# Patient Record
Sex: FEMALE | Race: WHITE
[De-identification: names, ages, dates, MRNs, and addresses within clinical notes are randomized per-mention and may not be internally consistent; named-entity substitution may affect disease eponyms.]

---

## 2018-01-23 ENCOUNTER — HOSPITAL ENCOUNTER (EMERGENCY)
Dept: HOSPITAL 63 - ER | Age: 28
Discharge: HOME | End: 2018-01-23
Payer: COMMERCIAL

## 2018-01-23 VITALS — WEIGHT: 186.73 LBS | HEIGHT: 63 IN | BODY MASS INDEX: 33.09 KG/M2

## 2018-01-23 VITALS — SYSTOLIC BLOOD PRESSURE: 122 MMHG | DIASTOLIC BLOOD PRESSURE: 70 MMHG

## 2018-01-23 DIAGNOSIS — Z88.1: ICD-10-CM

## 2018-01-23 DIAGNOSIS — Z79.4: ICD-10-CM

## 2018-01-23 DIAGNOSIS — F41.0: Primary | ICD-10-CM

## 2018-01-23 DIAGNOSIS — E10.65: ICD-10-CM

## 2018-01-23 DIAGNOSIS — Z88.0: ICD-10-CM

## 2018-01-23 LAB
ALBUMIN SERPL-MCNC: 3.7 G/DL (ref 3.4–5)
ALBUMIN/GLOB SERPL: 0.9 {RATIO} (ref 1–1.7)
ALP SERPL-CCNC: 95 U/L (ref 46–116)
ALT SERPL-CCNC: 27 U/L (ref 14–59)
ANION GAP SERPL CALC-SCNC: 11 MMOL/L (ref 6–14)
APTT PPP: YELLOW S
AST SERPL-CCNC: 20 U/L (ref 15–37)
BACTERIA #/AREA URNS HPF: 0 /HPF
BASOPHILS # BLD AUTO: 0.1 X10^3/UL (ref 0–0.2)
BASOPHILS NFR BLD: 1 % (ref 0–3)
BILIRUB SERPL-MCNC: 0.2 MG/DL (ref 0.2–1)
BILIRUB UR QL STRIP: (no result)
BUN ISTAT: 20 MG/DL (ref 8–26)
BUN/CREAT SERPL: 15 (ref 6–20)
CA-I SERPL ISE-MCNC: 20 MG/DL (ref 7–20)
CALCIUM SERPL-MCNC: 9 MG/DL (ref 8.5–10.1)
CHLORIDE SERPL-SCNC: 95 MMOL/L (ref 98–107)
CO2 SERPL-SCNC: 26 MMOL/L (ref 21–32)
CREAT SERPL-MCNC: 1.3 MG/DL (ref 0.6–1)
EOSINOPHIL NFR BLD: 0.2 X10^3/UL (ref 0–0.7)
EOSINOPHIL NFR BLD: 2 % (ref 0–3)
ERYTHROCYTE [DISTWIDTH] IN BLOOD BY AUTOMATED COUNT: 13.7 % (ref 11.5–14.5)
FIBRINOGEN PPP-MCNC: CLEAR MG/DL
GFR SERPLBLD BASED ON 1.73 SQ M-ARVRAT: 49.5 ML/MIN
GLOBULIN SER-MCNC: 4 G/DL (ref 2.2–3.8)
GLUCOSE BLD-MCNC: 378 MG/DL (ref 60–99)
GLUCOSE SERPL-MCNC: 388 MG/DL (ref 70–99)
GLUCOSE UR STRIP-MCNC: >=1000 MG/DL
HCT VFR BLD AUTO: 45 %
HCT VFR BLD CALC: 43.4 % (ref 36–47)
HEMOGLOBIN ISTAT: 15.3 GM/DL
HGB BLD-MCNC: 14.7 G/DL (ref 12–15.5)
LIPASE: 123 U/L (ref 73–393)
LYMPHOCYTES # BLD: 2.9 X10^3/UL (ref 1–4.8)
LYMPHOCYTES NFR BLD AUTO: 31 % (ref 24–48)
MCH RBC QN AUTO: 30 PG (ref 25–35)
MCHC RBC AUTO-ENTMCNC: 34 G/DL (ref 31–37)
MCV RBC AUTO: 87 FL (ref 79–100)
MONO #: 0.5 X10^3/UL (ref 0–1.1)
MONOCYTES NFR BLD: 5 % (ref 0–9)
NEUT #: 6 X10^3UL (ref 1.8–7.7)
NEUTROPHILS NFR BLD AUTO: 62 % (ref 31–73)
NITRITE UR QL STRIP: (no result)
PLATELET # BLD AUTO: 290 X10^3/UL (ref 140–400)
POTASSIUM BLD-SCNC: 3.8 MMOL/L (ref 3.5–5)
POTASSIUM SERPL-SCNC: 3.8 MMOL/L (ref 3.5–5.1)
PROT SERPL-MCNC: 7.7 G/DL (ref 6.4–8.2)
RBC # BLD AUTO: 4.98 X10^6/UL (ref 3.5–5.4)
RBC #/AREA URNS HPF: 0 /HPF (ref 0–2)
SODIUM SERPL-SCNC: 132 MMOL/L (ref 136–145)
SODIUM SERPL-SCNC: 133 MMOL/L (ref 135–145)
SP GR UR STRIP: <=1.005
SQUAMOUS #/AREA URNS LPF: (no result) /LPF
UROBILINOGEN UR-MCNC: 0.2 MG/DL
WBC # BLD AUTO: 9.7 X10^3/UL (ref 4–11)
WBC #/AREA URNS HPF: 0 /HPF (ref 0–4)

## 2018-01-23 PROCEDURE — 96361 HYDRATE IV INFUSION ADD-ON: CPT

## 2018-01-23 PROCEDURE — 71045 X-RAY EXAM CHEST 1 VIEW: CPT

## 2018-01-23 PROCEDURE — 80053 COMPREHEN METABOLIC PANEL: CPT

## 2018-01-23 PROCEDURE — 83690 ASSAY OF LIPASE: CPT

## 2018-01-23 PROCEDURE — 82947 ASSAY GLUCOSE BLOOD QUANT: CPT

## 2018-01-23 PROCEDURE — 81001 URINALYSIS AUTO W/SCOPE: CPT

## 2018-01-23 PROCEDURE — 36415 COLL VENOUS BLD VENIPUNCTURE: CPT

## 2018-01-23 PROCEDURE — 93005 ELECTROCARDIOGRAM TRACING: CPT

## 2018-01-23 PROCEDURE — 85025 COMPLETE CBC W/AUTO DIFF WBC: CPT

## 2018-01-23 PROCEDURE — 80047 BASIC METABLC PNL IONIZED CA: CPT

## 2018-01-23 PROCEDURE — 96374 THER/PROPH/DIAG INJ IV PUSH: CPT

## 2018-01-23 PROCEDURE — 83605 ASSAY OF LACTIC ACID: CPT

## 2018-01-23 PROCEDURE — 99285 EMERGENCY DEPT VISIT HI MDM: CPT

## 2018-01-23 NOTE — RAD
Portable chest, 1/23/2018:



History: Chest pain



Comparison is made to a study from 3/18/2013.  The heart size is normal.  The

lungs are clear.  There is no evidence of pleural fluid.



IMPRESSION: No acute cardiopulmonary abnormality is detected.

## 2018-01-23 NOTE — PHYS DOC
General


Chief Complaint:  ANXIETY/PANIC ATTACK


Stated Complaint:  ANXIETY


Time Seen by MD:  00:55


Source:  patient


Exam Limitations:  no limitations


Problems:  





History of Present Illness


Initial Comments


Patient is a 27-year-old female who comes to the ED complaining of what she 

thinks is a panic attack.


Patient states that for the past several days she's had trouble focusing, she 

is having trouble sleeping and performing at her job. She states that she's 

been very anxious, she's had chest tightness and shortness of breath and has 

also had a great deal of difficulty controlling her glucose. Patient is 

hyperventilating on arrival and complains of bilateral hands and lips tingling/

numbness. Patient is a type I diabetic and states she's taken "a ton of insulin

" today but has been unable to control her glucose. She denies any nausea or 

vomiting she does complain of polyuria and some abdominal pain and mild 

headache.


She denies any fever chills sweats or focal pain complaints consistent with 

possible infection.


Hand and lip symptoms resolve shortly after the patient is coached to stop 

hyperventilating.





ED vitals: Afebrile, 87, 22, 126/71, 96% room air


Timing/Duration:  other (past several days)


Severity:  severe


Modifying Factors:  improves with other


Associated Symptoms:  chest pain, headaches, malaise, shortness of breath, 

weakness, other


Allergies:  


Coded Allergies:  


     Penicillins (Verified  Allergy, Intermediate, rash, 1/11/17)


     metformin (Verified  Allergy, Intermediate, rash, 1/11/17)


     vancomycin (Verified  Allergy, Intermediate, red man syndrome, 1/11/17)


     cefuroxime (Verified  Allergy, Unknown, 1/17/17)





Past Medical History


Medical History:  diabetes


Surgical History:  other (abdominal abscess)


Psychosocial History:  anxiety





Social History


Smoker:  non-smoker


Alcohol:  none


Drugs:  none





Review of Systems


Constitutional:  denies chills, denies diaphoresis, denies fever, malaise


Respiratory:  denies cough, shortness of breath, denies wheezing


Cardiovascular:  chest pain, palpitations, denies syncope


Gastrointestinal:  abdominal pain, denies diarrhea, nausea, denies vomiting


Genitourinary:  see HPI


Musculoskeletal:  denies back pain, denies joint swelling, denies neck pain


Psychiatric/Neurological:  headache, denies numbness, denies paresthesia


Hematologic/Lymphatic:  denies blood clots, denies easy bleeding, denies easy 

bruising





Physical Exam


General Appearance:  WD/WN, mild distress


Eyes:  bilateral eye normal inspection, bilateral eye PERRL, bilateral eye EOMI


Ear, Nose, Throat:  hearing grossly normal, normal ENT inspection, normal 

pharynx


Neck:  non-tender, supple


Respiratory:  normal breath sounds, no respiratory distress


Cardiovascular:  normal peripheral pulses, regular rate, rhythm


Gastrointestinal:  non tender, soft


Back:  no CVA tenderness, no vertebral tenderness


Extremities:  normal range of motion, non-tender, normal inspection


Neurologic/Psychiatric:  CNs II-XII nml as tested, no motor/sensory deficits, 

alert, oriented x 3, other (anxious, denies suicidal or homicidal ideation 

judgment and insight appear to be intact)


Skin:  normal color, warm/dry





Orders, Labs, Meds


EKG: Normal sinus rhythm 93 bpm, nonspecific T contour abnormality no ST 

segment elevation interpreted by me





I-STAT BMP: Sodium 133, potassium 3.8, CO2 26, BUN 20, creatinine 1.0, glucose 

378, anion gap 17





Due to acidosis serum lab orders initiated: CBC unremarkable, sodium 132, 

potassium 3.8, CO2 26, BUN 20, creatinine 1.3, glucose 388, anion gap 11, 

lactic acid 2.3





Urinalysis: Glucose greater than 1000, no ketones or products of infection





0300: Patient just received her insulin, I discussed findings with her. 

Hyperventilation could be etiology of her lactic acidosis especially as her 

serum labs reveal normal anion gap. Repeat liter bolus of normal saline, Xanax 

0.5 mg given by mouth Will recheck patient's symptoms and glucose in 45 minutes 

to an hour and determine her disposition.





0339: Recheck of glucose 200.





0429: Patient states that she's feeling about 50% better overall with her 

anxiety and other symptoms. Her chest pain has resolved, she states that she 

used to take 2 mg of Xanax 3 times daily. I advised her that not only would I 

not prescribe that but that it was not a safe long-term solution for her 

anxiety. I offered her admission for observation which she declines. She 

requests one more 0.5 mg Xanax and discharged home, states she will follow-up 

with the [facility] or a walk-in doctor's office today or tomorrow. I 

discussed signs and symptoms to monitor as well as indications for urgent 

return to the department. I discussed the addictive potential of Xanax, 

prescription and over-the-counter medications, as well as exercise and therapy. 

The patient's questions were answered she expressed agreement and understanding 

of the treatment plan. She agrees to be compliant with her diet and diabetes 

medication regimen


Departure


Time of Disposition:  04:31


Disposition:  01 HOME, SELF-CARE


Diagnosis:  panic attack, uncontrolled diabetes nonketotic hyp


Condition:  IMPROVED


Patient Instructions:  Anxiety and Panic Attacks, Easy-to-Read, Form - Daily 

Diabetes Record





Additional Instructions:  


Please review the patient education materials given by ED staff.


As discussed observation admission was offered to you and you decline at this 

time.


Aggressive hydration.


Maintained consistent ADA diet and insulin regimen.


Log your blood glucose before each meal and at bedtime, take those values with 

you to your next physician's appointment.


Prescription: Xanax 0.25 mg quantity 5


Follow-up at the guidance Center or local physician's office for walk-in 

appointment today or tomorrow.


Return to ED with new or changing symptoms.











ANIYAH DOUGLASS DO Jan 23, 2018 01:29

## 2018-01-23 NOTE — EKG
[facility] [address], [place] [address]

Test Date:    2018               Test Time:    01:14:36

Pat Name:     LINETTE WISE          Department:   

Patient ID:   [id number]           Room:          

Gender:       F                        Technician:   MARLO

:          1990               Requested By: ANIYAH DOUGLASS

Order Number: [id number]            Reading MD:   Rony Gamble MD

                                 Measurements

Intervals                              Axis          

Rate:         93                       P:            31

MO:           114                      QRS:          41

QRSD:         90                       T:            26

QT:           372                                    

QTc:          465                                    

                           Interpretive Statements

SINUS RHYTHM



Electronically Signed On 2018 12:26:59 CST by Rony Gamble MD

## 2018-05-09 ENCOUNTER — HOSPITAL ENCOUNTER (EMERGENCY)
Dept: HOSPITAL 63 - ER | Age: 28
Discharge: HOME | End: 2018-05-09
Payer: SELF-PAY

## 2018-05-09 VITALS — SYSTOLIC BLOOD PRESSURE: 116 MMHG | DIASTOLIC BLOOD PRESSURE: 59 MMHG

## 2018-05-09 VITALS — BODY MASS INDEX: 32.61 KG/M2 | WEIGHT: 191 LBS | HEIGHT: 64 IN

## 2018-05-09 DIAGNOSIS — Z88.1: ICD-10-CM

## 2018-05-09 DIAGNOSIS — E10.65: Primary | ICD-10-CM

## 2018-05-09 DIAGNOSIS — Z71.6: ICD-10-CM

## 2018-05-09 DIAGNOSIS — Z88.0: ICD-10-CM

## 2018-05-09 DIAGNOSIS — F12.10: ICD-10-CM

## 2018-05-09 DIAGNOSIS — E10.10: ICD-10-CM

## 2018-05-09 DIAGNOSIS — E86.0: ICD-10-CM

## 2018-05-09 DIAGNOSIS — F41.9: ICD-10-CM

## 2018-05-09 DIAGNOSIS — Z88.8: ICD-10-CM

## 2018-05-09 DIAGNOSIS — F17.210: ICD-10-CM

## 2018-05-09 LAB
% BANDS: 3 % (ref 0–9)
% LYMPHS: 11 % (ref 24–48)
% MONOS: 4 % (ref 0–10)
% SEGS: 77 % (ref 35–66)
ALBUMIN SERPL-MCNC: 3.1 G/DL (ref 3.4–5)
ALBUMIN/GLOB SERPL: 0.8 {RATIO} (ref 1–1.7)
ALP SERPL-CCNC: 103 U/L (ref 46–116)
ALT SERPL-CCNC: 26 U/L (ref 14–59)
AMPHETAMINE/METHAMPHETAMINE: (no result)
ANION GAP SERPL CALC-SCNC: 18 MMOL/L (ref 6–14)
APTT PPP: (no result) S
AST SERPL-CCNC: 18 U/L (ref 15–37)
BACTERIA #/AREA URNS HPF: 0 /HPF
BARBITURATES UR-MCNC: (no result) UG/ML
BASOPHILS # BLD AUTO: 0 X10^3/UL (ref 0–0.2)
BASOPHILS NFR BLD: 0 % (ref 0–3)
BENZODIAZ UR-MCNC: (no result) UG/L
BGAS PCO2: 31 MMHG (ref 35–45)
BGAS PH: 7.42 (ref 7.35–7.45)
BGAS PO2: 75 MMHG (ref 80–100)
BILIRUB SERPL-MCNC: 0.4 MG/DL (ref 0.2–1)
BILIRUB UR QL STRIP: (no result)
BUN/CREAT SERPL: 13 (ref 6–20)
CA-I SERPL ISE-MCNC: 16 MG/DL (ref 7–20)
CALCIUM SERPL-MCNC: 9.8 MG/DL (ref 8.5–10.1)
CANNABINOIDS UR-MCNC: (no result) UG/L
CHLORIDE SERPL-SCNC: 96 MMOL/L (ref 98–107)
CO2 SERPL-SCNC: 21 MMOL/L (ref 21–32)
COCAINE UR-MCNC: (no result) NG/ML
CREAT SERPL-MCNC: 1.2 MG/DL (ref 0.6–1)
DELTA BASE BGAS: -5 MMOL/L (ref 0–3)
EOSINOPHIL NFR BLD AUTO: 2 % (ref 0–5)
EOSINOPHIL NFR BLD: 0.3 X10^3/UL (ref 0–0.7)
EOSINOPHIL NFR BLD: 2 % (ref 0–3)
ERYTHROCYTE [DISTWIDTH] IN BLOOD BY AUTOMATED COUNT: 14.1 % (ref 11.5–14.5)
FIBRINOGEN PPP-MCNC: CLEAR MG/DL
GFR SERPLBLD BASED ON 1.73 SQ M-ARVRAT: 53.9 ML/MIN
GLOBULIN SER-MCNC: 4.1 G/DL (ref 2.2–3.8)
GLUCOSE SERPL-MCNC: 457 MG/DL (ref 70–99)
GLUCOSE UR STRIP-MCNC: >=1000 MG/DL
HCT VFR BLD CALC: 42.4 % (ref 36–47)
HGB BLD-MCNC: 14 G/DL (ref 12–15.5)
LIPASE: 100 U/L (ref 73–393)
LYMPHOCYTES # BLD: 1.6 X10^3/UL (ref 1–4.8)
LYMPHOCYTES NFR BLD AUTO: 10 % (ref 24–48)
MAGNESIUM SERPL-MCNC: 1.2 MG/DL (ref 1.8–2.4)
MCH RBC QN AUTO: 30 PG (ref 25–35)
MCHC RBC AUTO-ENTMCNC: 33 G/DL (ref 31–37)
MCV RBC AUTO: 90 FL (ref 79–100)
METHADONE SERPL-MCNC: (no result) NG/ML
MONO #: 0.5 X10^3/UL (ref 0–1.1)
MONOCYTES NFR BLD: 3 % (ref 0–9)
NEUT #: 13.5 X10^3UL (ref 1.8–7.7)
NEUTROPHILS NFR BLD AUTO: 85 % (ref 31–73)
NITRITE UR QL STRIP: (no result)
O2 SAT BGAS: 96 % (ref 92–99)
O2/TOTAL GAS SETTING VFR VENT: 21 %
OPIATES UR-MCNC: (no result) NG/ML
PCP SERPL-MCNC: (no result) MG/DL
PHOSPHATE SERPL-MCNC: 4.4 MG/DL (ref 2.6–4.7)
PLATELET # BLD AUTO: 250 X10^3/UL (ref 140–400)
PLATELET # BLD EST: ADEQUATE 10*3/UL
POTASSIUM SERPL-SCNC: 4.3 MMOL/L (ref 3.5–5.1)
PROT SERPL-MCNC: 7.2 G/DL (ref 6.4–8.2)
RBC # BLD AUTO: 4.7 X10^6/UL (ref 3.5–5.4)
RBC #/AREA URNS HPF: 0 /HPF (ref 0–2)
SODIUM SERPL-SCNC: 135 MMOL/L (ref 136–145)
SP GR UR STRIP: 1.01
SQUAMOUS #/AREA URNS LPF: (no result) /LPF
UROBILINOGEN UR-MCNC: 0.2 MG/DL
WBC # BLD AUTO: 15.8 X10^3/UL (ref 4–11)
WBC #/AREA URNS HPF: 0 /HPF (ref 0–4)

## 2018-05-09 PROCEDURE — 80307 DRUG TEST PRSMV CHEM ANLYZR: CPT

## 2018-05-09 PROCEDURE — 82010 KETONE BODYS QUAN: CPT

## 2018-05-09 PROCEDURE — 83735 ASSAY OF MAGNESIUM: CPT

## 2018-05-09 PROCEDURE — 83690 ASSAY OF LIPASE: CPT

## 2018-05-09 PROCEDURE — G0479 DRUG TEST PRESUMP NOT OPT: HCPCS

## 2018-05-09 PROCEDURE — 36415 COLL VENOUS BLD VENIPUNCTURE: CPT

## 2018-05-09 PROCEDURE — 96374 THER/PROPH/DIAG INJ IV PUSH: CPT

## 2018-05-09 PROCEDURE — 96361 HYDRATE IV INFUSION ADD-ON: CPT

## 2018-05-09 PROCEDURE — 96375 TX/PRO/DX INJ NEW DRUG ADDON: CPT

## 2018-05-09 PROCEDURE — 82803 BLOOD GASES ANY COMBINATION: CPT

## 2018-05-09 PROCEDURE — 85007 BL SMEAR W/DIFF WBC COUNT: CPT

## 2018-05-09 PROCEDURE — 94640 AIRWAY INHALATION TREATMENT: CPT

## 2018-05-09 PROCEDURE — 81001 URINALYSIS AUTO W/SCOPE: CPT

## 2018-05-09 PROCEDURE — 85025 COMPLETE CBC W/AUTO DIFF WBC: CPT

## 2018-05-09 PROCEDURE — 84100 ASSAY OF PHOSPHORUS: CPT

## 2018-05-09 PROCEDURE — 80053 COMPREHEN METABOLIC PANEL: CPT

## 2018-05-09 PROCEDURE — 82553 CREATINE MB FRACTION: CPT

## 2018-05-09 PROCEDURE — 82947 ASSAY GLUCOSE BLOOD QUANT: CPT

## 2018-05-09 PROCEDURE — 99284 EMERGENCY DEPT VISIT MOD MDM: CPT

## 2018-05-09 NOTE — PHYS DOC
Past History


Past Medical History:  Anxiety, Diabetes


Past Surgical History:  Appendectomy, , Hysterectomy


Smoking:  Cigarettes


Alcohol Use:  Occasionally


Drug Use:  Marijuana





Adult General


Chief Complaint


Chief Complaint:  BLOOD SUGAR PROBLEM





HPI


HPI


28-year-old female patient with type 1 diabetes mellitus states her blood sugar 

was high yesterday and this morning was more than 550 and complaining of nausea 

and one episode of vomiting and complaining of hurting all over and rated her 

pain moderate. Patient denies missing her medication, recent dehydration, using 

drugs or alcohol. Patient states she had few episodes of DKA previously and her 

symptoms  looks like early DKA.





Review of Systems


Review of Systems





Constitutional: Denies fever or chills []


Eyes: Denies change in visual acuity, redness, or eye pain []


HENT: Denies nasal congestion or sore throat []


Respiratory: Denies cough or shortness of breath []


Cardiovascular: No additional information not addressed in HPI []


GI: Denies abdominal pain, bloody stools or diarrhea, reports nausea and 

vomiting[]


: Denies dysuria or hematuria , reports urinary frequency]


Musculoskeletal: Denies back pain or joint pain []


Integument: Denies rash or skin lesions []


Neurologic: Denies headache, focal weakness or sensory changes []


Endocrine: Denies polyuria or polydipsia []





All other systems were reviewed and found to be within normal limits, except as 

documented in this note.





Current Medications


Current Medications





Current Medications








 Medications


  (Trade)  Dose


 Ordered  Sig/Nitish  Start Time


 Stop Time Status Last Admin


Dose Admin


 


 Insulin Human


 Regular


  (NovoLIN R)  10 unit  1X  ONCE  18 11:00


 18 11:01 DC 18 11:06


10 UNIT


 


 Ondansetron HCl


  (Zofran)  4 mg  1X  ONCE  18 11:00


 18 11:01 DC 18 10:55


4 MG


 


 Sodium Chloride  1,000 ml @ 


 1,000 mls/hr  1X  ONCE  18 12:00


 18 12:59 DC 18 11:59


1,000 MLS/HR


 


 Sodium Chloride


  (Normal Saline


 Flush)  10 ml  QSHIFT  PRN  18 10:45


    18 11:06


10 ML











Allergies


Allergies





Allergies








Coded Allergies Type Severity Reaction Last Updated Verified


 


  Penicillins Allergy Intermediate rash 17 Yes


 


  metformin Allergy Intermediate rash 17 Yes


 


  vancomycin Allergy Intermediate red man syndrome 17 Yes


 


  cefuroxime Allergy Unknown  17 Yes











Physical Exam


Physical Exam





Constitutional: Well developed, well nourished, mild distress, non-toxic 

appearance. []


HENT: Normocephalic, atraumatic, oropharynx dry, no oral exudates, nose normal. 

[]


Eyes: PERRLA, EOMI, conjunctiva normal, no discharge. [] 


Neck: Normal range of motion, no tenderness, supple, no stridor. [] 


Cardiovascular: Tachycardia, no murmur []


Lungs & Thorax:  Bilateral breath sounds clear to auscultation []


Abdomen: Bowel sounds normal, soft, no tenderness, no masses, no pulsatile 

masses. [] 


Skin: Warm, dry, no erythema, no rash. [] 


Back: No tenderness, no CVA tenderness. [] 


Extremities: No tenderness, no cyanosis, no clubbing, ROM intact, no edema. [] 


Neurologic: Alert and oriented X 3, normal motor function, normal sensory 

function, no focal deficits noted. []


Psychologic: Affect normal, judgement normal, mood normal. []





Current Patient Data


Vital Signs





 Vital Signs








  Date Time  Temp Pulse Resp B/P (MAP) Pulse Ox O2 Delivery O2 Flow Rate FiO2


 


18 10:44 97.6 116 20  96 Room Air  








Lab Results





 Laboratory Tests








Test


  18


10:37 18


10:43 18


10:52 18


11:45


 


Glucose (Fingerstick)


  494 mg/dL


(70-99)  H 


  


  280 mg/dL


(70-99)  H


 


Blood pH


  


  7.42


(7.35-7.45) 


  


 


 


Blood Gas PCO2


  


  31 mmHg


(35-45)  L 


  


 


 


Blood Gas PO2


  


  75 mmHg


()  L 


  


 


 


Blood Gas HCO3


  


  20 mmol/L


(22-26)  L 


  


 


 


Arterial Bld O2 Saturation


(Calc) 


  96 % (92-99)  


  


  


 


 


FiO2  21 %    


 


White Blood Count


  


  


  15.8 x10^3/uL


(4.0-11.0)  H 


 


 


Red Blood Count


  


  


  4.70 x10^6/uL


(3.50-5.40) 


 


 


Hemoglobin


  


  


  14.0 g/dL


(12.0-15.5) 


 


 


Hematocrit


  


  


  42.4 %


(36.0-47.0) 


 


 


Mean Corpuscular Volume


  


  


  90 fL ()


  


 


 


Mean Corpuscular Hemoglobin   30 pg (25-35)   


 


Mean Corpuscular Hemoglobin


Concent 


  


  33 g/dL


(31-37) 


 


 


Red Cell Distribution Width


  


  


  14.1 %


(11.5-14.5) 


 


 


Platelet Count


  


  


  250 x10^3/uL


(140-400) 


 


 


Neutrophils (%) (Auto)   85 % (31-73)  H 


 


Lymphocytes (%) (Auto)   10 % (24-48)  L 


 


Monocytes (%) (Auto)   3 % (0-9)   


 


Eosinophils (%) (Auto)   2 % (0-3)   


 


Basophils (%) (Auto)   0 % (0-3)   


 


Neutrophils # (Auto)


  


  


  13.5 x10^3uL


(1.8-7.7)  H 


 


 


Lymphocytes # (Auto)


  


  


  1.6 x10^3/uL


(1.0-4.8) 


 


 


Monocytes # (Auto)


  


  


  0.5 x10^3/uL


(0.0-1.1) 


 


 


Eosinophils # (Auto)


  


  


  0.3 x10^3/uL


(0.0-0.7) 


 


 


Basophils # (Auto)


  


  


  0.0 x10^3/uL


(0.0-0.2) 


 


 


Segmented Neutrophils %   77 % (35-66)  H 


 


Band Neutrophils %   3 % (0-9)   


 


Lymphocytes %   11 % (24-48)  L 


 


Monocytes %   4 % (0-10)   


 


Eosinophils %   2 % (0-5)   


 


Platelet Estimate


  


  


  Adequate


(ADEQUATE) 


 


 


Sodium Level


  


  


  135 mmol/L


(136-145)  L 


 


 


Potassium Level


  


  


  4.3 mmol/L


(3.5-5.1) 


 


 


Chloride Level


  


  


  96 mmol/L


()  L 


 


 


Carbon Dioxide Level


  


  


  21 mmol/L


(21-32) 


 


 


Anion Gap   18 (6-14)  H 


 


Blood Urea Nitrogen


  


  


  16 mg/dL


(7-20) 


 


 


Creatinine


  


  


  1.2 mg/dL


(0.6-1.0)  H 


 


 


Estimated GFR


(Cockcroft-Gault) 


  


  53.9  


  


 


 


BUN/Creatinine Ratio   13 (6-20)   


 


Glucose Level


  


  


  457 mg/dL


(70-99)  H 


 


 


Calcium Level


  


  


  9.8 mg/dL


(8.5-10.1) 


 


 


Phosphorus Level


  


  


  4.4 mg/dL


(2.6-4.7) 


 


 


Magnesium Level


  


  


  1.2 mg/dL


(1.8-2.4)  L 


 


 


Total Bilirubin


  


  


  0.4 mg/dL


(0.2-1.0) 


 


 


Aspartate Amino Transferase


(AST) 


  


  18 U/L (15-37)


  


 


 


Alanine Aminotransferase (ALT)


  


  


  26 U/L (14-59)


  


 


 


Alkaline Phosphatase


  


  


  103 U/L


() 


 


 


Creatine Kinase


  


  


  57 U/L


() 


 


 


Creatine Kinase MB (Mass)


  


  


  < 0.5 ng/mL


(0.0-3.6) 


 


 


Creatine Kinase MB Relative


Index 


  


  0.9 % (0-4)  


  


 


 


Total Protein


  


  


  7.2 g/dL


(6.4-8.2) 


 


 


Albumin


  


  


  3.1 g/dL


(3.4-5.0)  L 


 


 


Albumin/Globulin Ratio


  


  


  0.8 (1.0-1.7)


L 


 


 


Lipase


  


  


  100 U/L


() 


 


 


Acetone Level   Neg (NEG)   


 


Urine Collection Type    Unknown  


 


Urine Color    Straw  


 


Urine Clarity    Clear  


 


Urine pH    5.5  


 


Urine Specific Gravity    1.010  


 


Urine Protein


  


  


  


  Neg


(NEG-TRACE)


 


Urine Glucose (UA)


  


  


  


  >=1000 mg/dL


(NEG)


 


Urine Ketones (Stick)


  


  


  


  15 mg/dL (NEG)


 


 


Urine Blood    Neg (NEG)  


 


Urine Nitrite    Neg (NEG)  


 


Urine Bilirubin    Neg (NEG)  


 


Urine Urobilinogen Dipstick


  


  


  


  0.2 mg/dL (0.2


mg/dL)


 


Urine Leukocyte Esterase    Neg (NEG)  


 


Urine RBC    0 /HPF (0-2)  


 


Urine WBC    0 /HPF (0-4)  


 


Urine Squamous Epithelial


Cells 


  


  


  Few /LPF  


 


 


Urine Bacteria


  


  


  


  0 /HPF (0-FEW)


 


 


Urine Opiates Screen    Neg (NEG)  


 


Urine Methadone Screen    Neg (NEG)  


 


Urine Barbiturates    Neg (NEG)  


 


Urine Phencyclidine Screen    Neg (NEG)  


 


Urine


Amphetamine/Methamphetamine 


  


  


  Neg (NEG)  


 


 


Urine Benzodiazepines Screen    Neg (NEG)  


 


Urine Cocaine Screen    Neg (NEG)  


 


Urine Cannabinoids Screen    Pos (NEG)  


 


Urine Ethyl Alcohol    Neg (NEG)  


 


Test


  18


13:11 


  


  


 


 


Glucose (Fingerstick)


  180 mg/dL


(70-99)  H 


  


  


 











EKG


EKG


[]





Radiology/Procedures


Radiology/Procedures


[]





Course & Med Decision Making


Course & Med Decision Making


Pertinent Labs reviewed. (See chart for details)


Evaluation of patient in ER showed 28-year-old female patient with type 1 

diabetes complaining of high blood sugar. Patient had blood sugar of more than 

400 at arrival to ER and treated with IV fluid and IV insulin and her blood 

sugar gradually decreased to 180. Patient had CO2 of 20 and pH of 7.42. Patient 

did not have vomiting and her pain improved with IV fluid. Patient felt better 

with treatment in ER and wanted to go home and tries her home medication.


[]





Dragon Disclaimer


Dragon Disclaimer


This electronic medical record was generated, in whole or in part, using a 

voice recognition dictation system.





Departure


Departure:


Impression:  


 Primary Impression:  


 Hyperglycemia


 Additional Impressions:  


 DKA, type 1


 Dehydration


 Nausea and vomiting


 Uncontrolled diabetes mellitus


 Tobacco abuse


 Tobacco abuse counseling


Disposition:   HOME, SELF-CARE (At 1325)


Condition:  IMPROVED


Referrals:  


KAYLA IRIZARRY MD (PCP)


Patient Instructions:  Diabetic Ketoacidosis, Hyperglycemia, Smoking Cessation





Additional Instructions:  


Drink plenty of liquids


Follow-up with your primary care physician in 3-5 days


Return to ER if not getting better


Scripts


Ondansetron (ZOFRAN ODT) 4 Mg Tab.rapdis


1 TAB SL Q8HRS, #15 TAB


   Prov: SUE SAPP MD         18





Problem Qualifiers











SUE SAPP MD May 9, 2018 13:27

## 2018-06-21 ENCOUNTER — HOSPITAL ENCOUNTER (EMERGENCY)
Dept: HOSPITAL 63 - ER | Age: 28
Discharge: HOME | End: 2018-06-21
Payer: SELF-PAY

## 2018-06-21 VITALS — HEIGHT: 63 IN | BODY MASS INDEX: 33.66 KG/M2 | WEIGHT: 190 LBS

## 2018-06-21 VITALS — SYSTOLIC BLOOD PRESSURE: 122 MMHG | DIASTOLIC BLOOD PRESSURE: 77 MMHG

## 2018-06-21 DIAGNOSIS — M25.511: ICD-10-CM

## 2018-06-21 DIAGNOSIS — Z88.0: ICD-10-CM

## 2018-06-21 DIAGNOSIS — F17.210: ICD-10-CM

## 2018-06-21 DIAGNOSIS — F41.9: ICD-10-CM

## 2018-06-21 DIAGNOSIS — E11.9: ICD-10-CM

## 2018-06-21 DIAGNOSIS — Z88.1: ICD-10-CM

## 2018-06-21 DIAGNOSIS — G89.29: Primary | ICD-10-CM

## 2018-06-21 DIAGNOSIS — Z88.8: ICD-10-CM

## 2018-06-21 PROCEDURE — 73030 X-RAY EXAM OF SHOULDER: CPT

## 2018-06-21 PROCEDURE — 99284 EMERGENCY DEPT VISIT MOD MDM: CPT

## 2018-06-21 PROCEDURE — 96372 THER/PROPH/DIAG INJ SC/IM: CPT

## 2018-06-21 NOTE — PHYS DOC
Past History


Past Medical History:  Anxiety, Diabetes


Past Surgical History:  Appendectomy, , Hysterectomy


Smoking:  Cigarettes


Alcohol Use:  Occasionally


Drug Use:  Marijuana





Adult General


Chief Complaint


Chief Complaint:  SHOULDER INJURY





HPI


HPI





Patient is a 28-year-old female who presents for evaluation of right posterior 

shoulder pain which she states has been present for months. She has seen her PCP

, Dr. Henderson, recently and had an injection. She states the pain is the same she 

has been experiencing for months and is in the same location but today she felt 

like she could not move her arm as well as normal throughout range of motion 

without discomfort. She denies any new injuries but does mention that she is 

currently in the middle of moving and has been using her arm a lot. She is 

alert and oriented 4, calm, and appears to be in no distress. She denies any 

focal weakness or numbness. She is alert and oriented 4, calm, and appears to 

be in no distress at this time.





Review of Systems


Review of Systems





Constitutional: Denies fever or chills []


Eyes: Denies change in visual acuity, redness, or eye pain []


HENT: Denies nasal congestion or sore throat []


Respiratory: Denies cough or shortness of breath []


Cardiovascular: No additional information not addressed in HPI []


GI: Denies abdominal pain, nausea, vomiting, bloody stools or diarrhea []


: Denies dysuria or hematuria []


Musculoskeletal: Denies back pain, + right posterior shoulder pain 


Integument: Denies rash or skin lesions []


Neurologic: Denies headache, focal weakness or sensory changes []


Endocrine: Denies polyuria or polydipsia []





All other systems were reviewed and found to be within normal limits, except as 

documented in this note.





Allergies


Allergies





Allergies








Coded Allergies Type Severity Reaction Last Updated Verified


 


  Penicillins Allergy Intermediate rash 17 Yes


 


  metformin Allergy Intermediate rash 17 Yes


 


  vancomycin Allergy Intermediate red man syndrome 17 Yes


 


  cefuroxime Allergy Unknown  17 Yes











Physical Exam


Physical Exam





Constitutional: Well developed, well nourished, no acute distress, non-toxic 

appearance. []


HENT: Normocephalic, atraumatic, bilateral external ears normal, oropharynx 

moist, no oral exudates, nose normal. []


Eyes: PERRLA, EOMI, conjunctiva normal, no discharge. [] 


Neck: Normal range of motion, no tenderness, supple, no stridor. [] 


Cardiovascular:Heart rate regular rhythm, no murmur []


Lungs & Thorax:  Bilateral breath sounds clear to auscultation []


Abdomen: Bowel sounds normal, soft, no tenderness, no masses, no pulsatile 

masses. [] 


Skin: Warm, dry, no erythema, no rash. [] 


Back: No tenderness, no CVA tenderness. [] 


Extremities: No tenderness, no cyanosis, no clubbing, ROM intact, no edema. [] +

right posterior upper back tenderness at upper border of right scapula, 

abduction limited by pain to about 90 degrees, CMS intact distally with 

excellent 2+ radial pulse, normal cap refill <2 sec, excellent  strength 

and normal sensation, no shoulder dislocation/deformity, appears atraumatic


Neurologic: Alert and oriented X 3, normal motor function, normal sensory 

function, no focal deficits noted. []


Psychologic: Affect normal, judgement normal, mood normal. []





EKG


EKG


[]





Radiology/Procedures


Radiology/Procedures


 [facility] [address], [place] [address] [phone]


 


 IMAGING REPORT





 Signed





PATIENT: LINETTE WISE ACCOUNT: [id number] MRN#: [id number]


: 1990 LOCATION: ER AGE: 28


SEX: F EXAM DT: 18 ACCESSION#: [id number]


STATUS: REG ER ORD. PHYSICIAN: TERESA GUTIERREZ DO 


REASON: right shoulder pain


PROCEDURE: SHOULDER 2+V RIGHT





SHOULDER 2+V RIGHT


 


History: Arthritis pain in right shoulder 


 


Comparison: None.


 


Findings:


3 views of the right shoulder are submitted. No acute fracture or 


dislocation is identified. No significant degenerative change is 


identified.


 


Impression: 


 


1.  No significant osseous abnormality is identified by radiographs.


 


Electronically signed by: Princess Smith MD (2018 10:45 AM) 


[facility]














DICTATED AND SIGNED BY:     PRINCESS SMITH MD


DATE:     18 1044





CC: TERESA GUTIERREZ DO; KAYLA IRIZARRY MD ~





Course & Med Decision Making


Course & Med Decision Making


Pertinent Labs and Imaging studies reviewed. (See chart for details)





@1050 - Patient updated on imaging results. She states she is feeling better 

after the Toradol. Her injection with Dr. Henderson was back in March she states 

that it did seem to help. Sling applied. Advised patient to follow up with Dr. Henderson in the next 2-3 days. We'll send the patient home with prescriptions for 

Flexeril and Mobic.





Dragon Disclaimer


Dragon Disclaimer


This electronic medical record was generated, in whole or in part, using a 

voice recognition dictation system.





Departure


Departure:


Impression:  


 Primary Impression:  


 Shoulder pain, right


 Additional Impression:  


 Chronic pain


Disposition:   HOME, SELF-CARE


Condition:  STABLE


Referrals:  


KAYLA IRIZARRY MD (PCP)


Patient Instructions:  Arm Sling Use, Easy-to-Read, Shoulder Pain





Additional Instructions:  


Take the prescribed medicine as directed, as needed. Follow-up with your 

primary care physician Dr. Henderson in the next 2-3 days. Return to the ER for new 

or worsening symptoms. Use the sling provided for comfort.


Scripts


Meloxicam (MOBIC) 15 Mg Tablet


1 TAB PO DAILY PRN for PAIN, #20 TAB 1 Refill


   Prov: TERESA GUTIERREZ DO         18 


Cyclobenzaprine Hcl (CYCLOBENZAPRINE HCL) 10 Mg Tablet


1 TAB PO TID PRN for MUSCLE SPASMS, #20 TAB


   Prov: TERESA GUTIERREZ DO         18





Problem Qualifiers











TERESA GUTIERREZ DO 2018 10:32

## 2018-06-21 NOTE — RAD
SHOULDER 2+V RIGHT

 

History: Arthritis pain in right shoulder 

 

Comparison: None.

 

Findings:

3 views of the right shoulder are submitted. No acute fracture or 

dislocation is identified. No significant degenerative change is 

identified.

 

Impression: 

 

1.  No significant osseous abnormality is identified by radiographs.

 

Electronically signed by: Tate Gilbert MD (6/21/2018 10:45 AM) 

[email or web address]

## 2018-10-10 ENCOUNTER — HOSPITAL ENCOUNTER (EMERGENCY)
Dept: HOSPITAL 63 - ER | Age: 28
Discharge: HOME | End: 2018-10-10
Payer: SELF-PAY

## 2018-10-10 VITALS — HEIGHT: 64 IN | BODY MASS INDEX: 35.19 KG/M2 | WEIGHT: 206.13 LBS

## 2018-10-10 VITALS — DIASTOLIC BLOOD PRESSURE: 79 MMHG | SYSTOLIC BLOOD PRESSURE: 128 MMHG

## 2018-10-10 DIAGNOSIS — Z90.89: ICD-10-CM

## 2018-10-10 DIAGNOSIS — F17.210: ICD-10-CM

## 2018-10-10 DIAGNOSIS — Z88.0: ICD-10-CM

## 2018-10-10 DIAGNOSIS — E11.65: ICD-10-CM

## 2018-10-10 DIAGNOSIS — Z88.8: ICD-10-CM

## 2018-10-10 DIAGNOSIS — Z90.49: ICD-10-CM

## 2018-10-10 DIAGNOSIS — Z88.1: ICD-10-CM

## 2018-10-10 DIAGNOSIS — N83.02: Primary | ICD-10-CM

## 2018-10-10 DIAGNOSIS — J45.909: ICD-10-CM

## 2018-10-10 DIAGNOSIS — Z90.710: ICD-10-CM

## 2018-10-10 LAB
APTT PPP: YELLOW S
BACTERIA #/AREA URNS HPF: (no result) /HPF
BILIRUB UR QL STRIP: (no result)
FIBRINOGEN PPP-MCNC: (no result) MG/DL
GLUCOSE UR STRIP-MCNC: >=1000 MG/DL
NITRITE UR QL STRIP: (no result)
RBC #/AREA URNS HPF: 0 /HPF (ref 0–2)
SP GR UR STRIP: 1.02
SQUAMOUS #/AREA URNS LPF: (no result) /LPF
UROBILINOGEN UR-MCNC: 0.2 MG/DL
WBC #/AREA URNS HPF: (no result) /HPF (ref 0–4)

## 2018-10-10 PROCEDURE — 96372 THER/PROPH/DIAG INJ SC/IM: CPT

## 2018-10-10 PROCEDURE — 76856 US EXAM PELVIC COMPLETE: CPT

## 2018-10-10 PROCEDURE — 76830 TRANSVAGINAL US NON-OB: CPT

## 2018-10-10 PROCEDURE — 82947 ASSAY GLUCOSE BLOOD QUANT: CPT

## 2018-10-10 PROCEDURE — 99285 EMERGENCY DEPT VISIT HI MDM: CPT

## 2018-10-10 PROCEDURE — 81001 URINALYSIS AUTO W/SCOPE: CPT

## 2018-10-10 RX ADMIN — SODIUM CHLORIDE ONE UNIT: 900 INJECTION, SOLUTION INTRAVENOUS at 17:51

## 2018-10-10 NOTE — PHYS DOC
Past History


Past Medical History:  Asthma, Bipolar, Diabetes


Past Surgical History:  Appendectomy, Cholecystectomy, Hysterectomy, 

Tonsillectomy


Smoking:  Cigarettes


Alcohol Use:  Occasionally


Drug Use:  None





Adult General


Chief Complaint


Chief Complaint:  ABDOMINAL PAIN





HPI


HPI





Patient is a 28 year old female who presents with in of left lower quadrant 

intermittent pain for 1 week. Patient complaining of multiple episodes of sharp 

left lower quadrant pain that usually lasts for few seconds to minutes and not 

related to activity or eating or urination. Patient denies fever and chills, 

constipation and diarrhea, vaginal bleeding or discharge. Patient had history 

of hysterectomy with having only left ovary and is concern for left ovarian 

cyst or problem.





Review of Systems


Review of Systems





Constitutional: Denies fever or chills []


Eyes: Denies change in visual acuity, redness, or eye pain []


HENT: Denies nasal congestion or sore throat []


Respiratory: Denies cough or shortness of breath []


Cardiovascular: No additional information not addressed in HPI []


GI: Reports abdominal pain, denies nausea, vomiting, bloody stools or diarrhea [

]


: Denies dysuria or hematuria []


Musculoskeletal: Denies back pain or joint pain []


Integument: Denies rash or skin lesions []


Neurologic: Denies headache, focal weakness or sensory changes []


Endocrine: Denies polyuria or polydipsia []





All other systems were reviewed and found to be within normal limits, except as 

documented in this note.





Current Medications


Current Medications





Current Medications








 Medications


  (Trade)  Dose


 Ordered  Sig/Nitish  Start Time


 Stop Time Status Last Admin


Dose Admin


 


 Insulin Human


 Regular


  (HumuLIN R VIAL)  10 unit  1X  ONCE  10/10/18 17:45


 10/10/18 17:46 UNV  














Allergies


Allergies





Allergies








Coded Allergies Type Severity Reaction Last Updated Verified


 


  Penicillins Allergy Intermediate rash 10/10/18 Yes


 


  metformin Allergy Intermediate rash 10/10/18 Yes


 


  vancomycin Allergy Intermediate red man syndrome 10/10/18 Yes


 


  cefuroxime Allergy Unknown  10/10/18 Yes











Physical Exam


Physical Exam





Constitutional: Well developed, well nourished, no acute distress, non-toxic 

appearance. []


HENT: Normocephalic, atraumatic


Eyes: PERRLA, EOMI, conjunctiva normal, no discharge. [] 


Neck: Normal range of motion, no tenderness, supple, no stridor. [] 


Cardiovascular:Heart rate regular rhythm, no murmur []


Lungs & Thorax:  Bilateral breath sounds clear to auscultation []


Abdomen: Bowel sounds normal, soft, no tenderness, no masses, no pulsatile 

masses. [] 


Skin: Warm, dry, no erythema, no rash. [] 


Back: No tenderness, no CVA tenderness. [] 


Extremities: No tenderness, no cyanosis, no clubbing, ROM intact, no edema. [] 


Neurologic: Alert and oriented X 3, normal motor function, normal sensory 

function, no focal deficits noted. []


Psychologic: Affect normal, judgement normal, mood normal. []





Current Patient Data


Vital Signs





 Vital Signs








  Date Time  Temp Pulse Resp B/P (MAP) Pulse Ox O2 Delivery O2 Flow Rate FiO2


 


10/10/18 16:19 97.7 87 18  96 Room Air  








Lab Results





 Laboratory Tests








Test


 10/10/18


16:41 10/10/18


17:41


 


Urine Collection Type Unknown   


 


Urine Color Yellow   


 


Urine Clarity Cloudy   


 


Urine pH 6.0   


 


Urine Specific Gravity 1.025   


 


Urine Protein


 Neg


(NEG-TRACE) 





 


Urine Glucose (UA)


 >=1000 mg/dL


(NEG) 





 


Urine Ketones (Stick)


 40 mg/dL (NEG)


 





 


Urine Blood Neg (NEG)   


 


Urine Nitrite Neg (NEG)   


 


Urine Bilirubin Neg (NEG)   


 


Urine Urobilinogen Dipstick


 0.2 mg/dL (0.2


mg/dL) 





 


Urine Leukocyte Esterase Neg (NEG)   


 


Urine RBC 0 /HPF (0-2)   


 


Urine WBC


 Occ /HPF (0-4)


 





 


Urine Squamous Epithelial


Cells Mod /LPF  


 





 


Urine Bacteria


 Few /HPF


(0-FEW) 





 


Urine Mucus Slight /LPF   


 


Glucose (Fingerstick)


 


 430 mg/dL


(70-99)  H











EKG


EKG


[]





Radiology/Procedures


Radiology/Procedures


 [facility] [address], [place] [address] [phone]


 


 IMAGING REPORT





 Signed





PATIENT: LINETTE WISE ACCOUNT: [id number] MRN#: [id number]


: 1990 LOCATION: ER AGE: 28


SEX: F EXAM DT: 10/10/18 ACCESSION#: [id number]


STATUS: REG ER ORD. PHYSICIAN: SUE SAPP MD 


REASON: pelvic pain, history of hysterectomy


PROCEDURE: US PELVIS W/TV





EXAM: PELVIC ULTRASOUND.


 


HISTORY: Pelvic pain.


 


COMPARISON: None.


 


FINDINGS: Sonographic evaluation of the pelvis was performed 


transabdominally and transvaginally.


 


The uterus is surgically absent. There is no pelvic mass.


 


The right ovary is surgically absent. The left ovary measures 4.1 x 3.4 x 


2.7 cm. A 2.3 x 1.8 cm hypoechoic solid appearing region within the left 


ovary demonstrates no clear internal flow and is likely a hemorrhagic 


follicle. There is normal flow elsewhere in the left ovary. There is a 


small amount of adjacent free fluid.


 


IMPRESSION:


1. 2.3 x 1.8 cm hypoechoic mass within the left ovary without internal 


flow. This is likely a hemorrhagic follicle. Follow-up is suggested in 3 


months to confirm resolution.


 


Electronically signed by: SANJU Sotelo MD (10/10/2018 5:55 PM) 


[facility]














DICTATED AND SIGNED BY:     ARIELA SOTELO MD


DATE:     10/10/18 1750





CC: SUE SAPP MD; KAYLA IRIZARRY MD ~





Course & Med Decision Making


Course & Med Decision Making


Pertinent Labs and Imaging studies reviewed. (See chart for details)





Evaluation of patient in ER showed 28-year-old female patient with complaining 

of left lower quadrant pain for 1 week intermittently with history of 

hysterectomy and right oophorectomy. She hasn't had unremarkable physical exam. 

UA showed more than 1000 mg of glucose and blood sugar is 430. Patient is type 

1 diabetes and taking sliding scale insulin and he states her blood sugar was 

high this morning but dropped to 150 this afternoon. Patient did not have sign 

of DKA. Patient had 10 units of insulin regular subcutaneous and bleeding to 

recheck the blood sugar. Patient's care transferred to Dr. Carney at 1800.





 through discussion with the patient regarding her diagnoses also her sugar 

finding I advised to check her CBC CMP to rule out DKA patient declined stated 

I don't have insurance I do not want before this just will go home I explained 

to the patient restive benefits she verbalizes understanding she stated I have 

glucometer at home I can check sugar at home patient requested to be discharged 

home





Dragon Disclaimer


Dragon Disclaimer


This electronic medical record was generated, in whole or in part, using a 

voice recognition dictation system.





Departure


Departure:


Impression:  


 Primary Impression:  


 Left lower quadrant pain


 Additional Impressions:  


 Hyperglycemia


 Follicular cyst of left ovary


Referrals:  


KAYLA IRIZARRY MD (PCP)


Scripts


Hydrocodone Bit/Acetaminophen (NORCO 5-325 TABLET) 1 Each Tablet


1-2 TAB PO Q4-6HRS PRN for PAIN, #12 TAB


   Prov: CHE VILLAGOMEZ MD         10/10/18





Problem Qualifiers











SUE SAPP MD Oct 10, 2018 17:53


CHE VILLAGOMEZ MD Oct 10, 2018 18:24

## 2018-10-10 NOTE — RAD
EXAM: PELVIC ULTRASOUND.

 

HISTORY: Pelvic pain.

 

COMPARISON: None.

 

FINDINGS: Sonographic evaluation of the pelvis was performed 

transabdominally and transvaginally.

 

The uterus is surgically absent. There is no pelvic mass.

 

The right ovary is surgically absent. The left ovary measures 4.1 x 3.4 x 

2.7 cm. A 2.3 x 1.8 cm hypoechoic solid appearing region within the left 

ovary demonstrates no clear internal flow and is likely a hemorrhagic 

follicle. There is normal flow elsewhere in the left ovary. There is a 

small amount of adjacent free fluid.

 

IMPRESSION:

1. 2.3 x 1.8 cm hypoechoic mass within the left ovary without internal 

flow. This is likely a hemorrhagic follicle. Follow-up is suggested in 3 

months to confirm resolution.

 

Electronically signed by: SANJU Sotelo MD (10/10/2018 5:55 PM) 

[facility]

## 2019-03-03 ENCOUNTER — HOSPITAL ENCOUNTER (EMERGENCY)
Dept: HOSPITAL 63 - ER | Age: 29
Discharge: HOME | End: 2019-03-03
Payer: SELF-PAY

## 2019-03-03 VITALS — WEIGHT: 219 LBS | BODY MASS INDEX: 37.39 KG/M2 | HEIGHT: 64 IN

## 2019-03-03 VITALS — SYSTOLIC BLOOD PRESSURE: 120 MMHG | DIASTOLIC BLOOD PRESSURE: 67 MMHG

## 2019-03-03 DIAGNOSIS — E11.65: ICD-10-CM

## 2019-03-03 DIAGNOSIS — Z88.8: ICD-10-CM

## 2019-03-03 DIAGNOSIS — F31.9: ICD-10-CM

## 2019-03-03 DIAGNOSIS — J45.909: ICD-10-CM

## 2019-03-03 DIAGNOSIS — F17.210: ICD-10-CM

## 2019-03-03 DIAGNOSIS — Z88.1: ICD-10-CM

## 2019-03-03 DIAGNOSIS — Z88.0: ICD-10-CM

## 2019-03-03 DIAGNOSIS — E11.10: Primary | ICD-10-CM

## 2019-03-03 LAB
ALBUMIN SERPL-MCNC: 3.6 G/DL (ref 3.4–5)
ALBUMIN/GLOB SERPL: 1 {RATIO} (ref 1–1.7)
ALP SERPL-CCNC: 81 U/L (ref 46–116)
ALT SERPL-CCNC: 18 U/L (ref 14–59)
ANION GAP SERPL CALC-SCNC: 20 MMOL/L (ref 6–14)
APTT PPP: (no result) S
AST SERPL-CCNC: 18 U/L (ref 15–37)
BACTERIA #/AREA URNS HPF: 0 /HPF
BASOPHILS # BLD AUTO: 0 X10^3/UL (ref 0–0.2)
BASOPHILS NFR BLD: 0 % (ref 0–3)
BILIRUB SERPL-MCNC: 0.6 MG/DL (ref 0.2–1)
BILIRUB UR QL STRIP: (no result)
BUN/CREAT SERPL: 16 (ref 6–20)
CA-I SERPL ISE-MCNC: 19 MG/DL (ref 7–20)
CALCIUM SERPL-MCNC: 8.8 MG/DL (ref 8.5–10.1)
CHLORIDE SERPL-SCNC: 91 MMOL/L (ref 98–107)
CO2 SERPL-SCNC: 21 MMOL/L (ref 21–32)
CREAT SERPL-MCNC: 1.2 MG/DL (ref 0.6–1)
EOSINOPHIL NFR BLD: 0.2 X10^3/UL (ref 0–0.7)
EOSINOPHIL NFR BLD: 3 % (ref 0–3)
ERYTHROCYTE [DISTWIDTH] IN BLOOD BY AUTOMATED COUNT: 13.7 % (ref 11.5–14.5)
FIBRINOGEN PPP-MCNC: CLEAR MG/DL
GFR SERPLBLD BASED ON 1.73 SQ M-ARVRAT: 53.9 ML/MIN
GLOBULIN SER-MCNC: 3.7 G/DL (ref 2.2–3.8)
GLUCOSE SERPL-MCNC: 669 MG/DL (ref 70–99)
GLUCOSE UR STRIP-MCNC: >=1000 MG/DL
HCT VFR BLD CALC: 43.1 % (ref 36–47)
HGB BLD-MCNC: 14.1 G/DL (ref 12–15.5)
LYMPHOCYTES # BLD: 1.9 X10^3/UL (ref 1–4.8)
LYMPHOCYTES NFR BLD AUTO: 23 % (ref 24–48)
MCH RBC QN AUTO: 30 PG (ref 25–35)
MCHC RBC AUTO-ENTMCNC: 33 G/DL (ref 31–37)
MCV RBC AUTO: 93 FL (ref 79–100)
MONO #: 0.6 X10^3/UL (ref 0–1.1)
MONOCYTES NFR BLD: 7 % (ref 0–9)
NEUT #: 5.8 X10^3UL (ref 1.8–7.7)
NEUTROPHILS NFR BLD AUTO: 68 % (ref 31–73)
NITRITE UR QL STRIP: (no result)
PLATELET # BLD AUTO: 217 X10^3/UL (ref 140–400)
POTASSIUM SERPL-SCNC: 4.4 MMOL/L (ref 3.5–5.1)
PROT SERPL-MCNC: 7.3 G/DL (ref 6.4–8.2)
RBC # BLD AUTO: 4.64 X10^6/UL (ref 3.5–5.4)
RBC #/AREA URNS HPF: 0 /HPF (ref 0–2)
SODIUM SERPL-SCNC: 132 MMOL/L (ref 136–145)
SP GR UR STRIP: <=1.005
SQUAMOUS #/AREA URNS LPF: (no result) /LPF
UROBILINOGEN UR-MCNC: 0.2 MG/DL
WBC # BLD AUTO: 8.6 X10^3/UL (ref 4–11)
WBC #/AREA URNS HPF: (no result) /HPF (ref 0–4)

## 2019-03-03 PROCEDURE — 84443 ASSAY THYROID STIM HORMONE: CPT

## 2019-03-03 PROCEDURE — 80053 COMPREHEN METABOLIC PANEL: CPT

## 2019-03-03 PROCEDURE — 81001 URINALYSIS AUTO W/SCOPE: CPT

## 2019-03-03 PROCEDURE — 82947 ASSAY GLUCOSE BLOOD QUANT: CPT

## 2019-03-03 PROCEDURE — 96374 THER/PROPH/DIAG INJ IV PUSH: CPT

## 2019-03-03 PROCEDURE — 96376 TX/PRO/DX INJ SAME DRUG ADON: CPT

## 2019-03-03 PROCEDURE — 85025 COMPLETE CBC W/AUTO DIFF WBC: CPT

## 2019-03-03 PROCEDURE — 36415 COLL VENOUS BLD VENIPUNCTURE: CPT

## 2019-03-03 PROCEDURE — 99283 EMERGENCY DEPT VISIT LOW MDM: CPT

## 2019-03-03 PROCEDURE — 96375 TX/PRO/DX INJ NEW DRUG ADDON: CPT

## 2019-03-03 PROCEDURE — 96361 HYDRATE IV INFUSION ADD-ON: CPT

## 2019-03-03 NOTE — PHYS DOC
Past History


Past Medical History:  Asthma, Bipolar, Diabetes


Past Surgical History:  Appendectomy, Cholecystectomy, Hysterectomy, 

Tonsillectomy


Smoking:  Cigarettes


Alcohol Use:  Occasionally


Drug Use:  None





Adult General


Chief Complaint


Chief Complaint:  HYPERGLYCEMIA





HPI


HPI


28-year-old female presents with hyperglycemia. She is a long-standing diabetic 

on insulin. Patient tells me that the last 1 week her blood sugars have been 

all over the place. They've been as low as 30 to as high as being about her 

meters capability. They the patient's blood sugar was higher than her meter 

again and she is feeling nauseated. She has not really had any abdominal pain. 

She denies diarrhea. She was told by her PCP yesterday or the day before that 

she has an enlarged thyroid and will need further workup. She denies fever or 

chills.





Review of Systems


Review of Systems





Constitutional: Denies fever or chills []


Eyes: Denies change in visual acuity, redness, or eye pain []


HENT: Denies nasal congestion or sore throat []


Respiratory: Denies cough or shortness of breath []


Cardiovascular: No additional information not addressed in HPI []


GI: Nausea.  Denies abdominal pain, vomiting, bloody stools or diarrhea []


: Denies dysuria or hematuria []


Musculoskeletal: Denies back pain or joint pain []


Integument: Denies rash or skin lesions []


Neurologic: Denies headache, focal weakness or sensory changes []


Endocrine: Denies polyuria or polydipsia []





All other systems were reviewed and found to be within normal limits, except as 

documented in this note.





Current Medications


Current Medications





Current Medications








 Medications


  (Trade)  Dose


 Ordered  Sig/[facility]  Start Time


 Stop Time Status Last Admin


Dose Admin


 


 Insulin Human


 Regular


  (HumuLIN R VIAL)  10 unit  1X  ONCE  3/3/19 15:15


 3/3/19 15:16   





 


 Sodium Chloride  1,000 ml @ 


 1,000 mls/hr  1X  ONCE  3/3/19 15:00


 3/3/19 15:59   














Allergies


Allergies





Allergies








Coded Allergies Type Severity Reaction Last Updated Verified


 


  Penicillins Allergy Intermediate rash 10/10/18 Yes


 


  metformin Allergy Intermediate rash 10/10/18 Yes


 


  vancomycin Allergy Intermediate red man syndrome 10/10/18 Yes


 


  cefuroxime Allergy Unknown  10/10/18 Yes











Physical Exam


Physical Exam





Constitutional: Well developed, well nourished, no acute distress, non-toxic 

appearance. []


HENT: Normocephalic, atraumatic, bilateral external ears normal, oropharynx 

moist, no oral exudates, nose normal. []


Eyes: PERRLA, EOMI, conjunctiva normal, no discharge. [] 


Neck: Normal range of motion, no tenderness, supple, no stridor. [] 


Cardiovascular:Heart rate regular rhythm, no murmur []


Lungs & Thorax:  Bilateral breath sounds clear to auscultation []


Abdomen: Bowel sounds normal, soft, no tenderness, no masses, no pulsatile 

masses. [] 


Skin: Warm, dry, no erythema, no rash. [] 


Back: No tenderness, no CVA tenderness. [] 


Extremities: No tenderness, no cyanosis, no clubbing, ROM intact, no edema. [] 


Neurologic: Alert and oriented X 3, normal motor function, normal sensory 

function, no focal deficits noted. []


Psychologic: Affect normal, judgement normal, mood normal. []





Current Patient Data


Vital Signs





 Vital Signs








  Date Time  Temp Pulse Resp B/P (MAP) Pulse Ox O2 Delivery O2 Flow Rate FiO2


 


3/3/19 15:02 98.2 76 18  100 Room Air  








Lab Results





 Laboratory Tests








Test


 3/3/19


14:58


 


White Blood Count


 8.6 x10^3/uL


(4.0-11.0)


 


Red Blood Count


 4.64 x10^6/uL


(3.50-5.40)


 


Hemoglobin


 14.1 g/dL


(12.0-15.5)


 


Hematocrit


 43.1 %


(36.0-47.0)


 


Mean Corpuscular Volume


 93 fL ()





 


Mean Corpuscular Hemoglobin 30 pg (25-35)  


 


Mean Corpuscular Hemoglobin


Concent 33 g/dL


(31-37)


 


Red Cell Distribution Width


 13.7 %


(11.5-14.5)


 


Platelet Count


 217 x10^3/uL


(140-400)


 


Neutrophils (%) (Auto) 68 % (31-73)  


 


Lymphocytes (%) (Auto) 23 % (24-48)  L


 


Monocytes (%) (Auto) 7 % (0-9)  


 


Eosinophils (%) (Auto) 3 % (0-3)  


 


Basophils (%) (Auto) 0 % (0-3)  


 


Neutrophils # (Auto)


 5.8 x10^3uL


(1.8-7.7)


 


Lymphocytes # (Auto)


 1.9 x10^3/uL


(1.0-4.8)


 


Monocytes # (Auto)


 0.6 x10^3/uL


(0.0-1.1)


 


Eosinophils # (Auto)


 0.2 x10^3/uL


(0.0-0.7)


 


Basophils # (Auto)


 0.0 x10^3/uL


(0.0-0.2)











EKG


EKG


[]





Radiology/Procedures


Radiology/Procedures


[]





Course & Med Decision Making


Course & Med Decision Making


Pertinent Labs and Imaging studies reviewed. (See chart for details)


Give the patient 1 L normal saline and 10 units of regular insulin. Her initial 

blood sugar was 669.  Her repeat blood sugar is 357. I will give her an 

additional 5 units IV. The patient's third blood sugar is under 250. The 

patient is feeling better overall. I believe she can be safely discharged as 

she has shown no signs of difficulty due to her DKA. I will give her an insulin 

strategies to use at home that if she is above 350 take 5 units of short-acting 

and if she 500 a greater to take 10. She is stable for discharge at this time.


[]





Dragon Disclaimer


Dragon Disclaimer


This electronic medical record was generated, in whole or in part, using a 

voice recognition dictation system.





Departure


Departure:


Impression:  


 Primary Impression:  


 DKA (diabetic ketoacidoses)


Disposition:  01 HOME, SELF-CARE


Condition:  IMPROVED


Referrals:  


KAYLA IRIZARRY MD (PCP)


Patient Instructions:  Diabetic Ketoacidosis





Problem Qualifiers








 Primary Impression:  


 DKA (diabetic ketoacidoses)


 Diabetes mellitus type:  type 2  Diabetes mellitus complication detail:  

without coma  Qualified Codes:  E11.10 - Type 2 diabetes mellitus with 

ketoacidosis without coma








SHIRAZ POSADAS DO Mar 3, 2019 15:15

## 2019-03-05 ENCOUNTER — HOSPITAL ENCOUNTER (INPATIENT)
Dept: HOSPITAL 63 - ER | Age: 29
LOS: 1 days | Discharge: HOME | DRG: 638 | End: 2019-03-06
Attending: INTERNAL MEDICINE | Admitting: INTERNAL MEDICINE
Payer: SELF-PAY

## 2019-03-05 VITALS — DIASTOLIC BLOOD PRESSURE: 61 MMHG | SYSTOLIC BLOOD PRESSURE: 109 MMHG

## 2019-03-05 VITALS — SYSTOLIC BLOOD PRESSURE: 98 MMHG | DIASTOLIC BLOOD PRESSURE: 55 MMHG

## 2019-03-05 VITALS — SYSTOLIC BLOOD PRESSURE: 96 MMHG | DIASTOLIC BLOOD PRESSURE: 57 MMHG

## 2019-03-05 VITALS — SYSTOLIC BLOOD PRESSURE: 102 MMHG | DIASTOLIC BLOOD PRESSURE: 67 MMHG

## 2019-03-05 VITALS — BODY MASS INDEX: 37.21 KG/M2 | HEIGHT: 63 IN | WEIGHT: 210 LBS

## 2019-03-05 VITALS — DIASTOLIC BLOOD PRESSURE: 58 MMHG | SYSTOLIC BLOOD PRESSURE: 135 MMHG

## 2019-03-05 DIAGNOSIS — G56.03: ICD-10-CM

## 2019-03-05 DIAGNOSIS — Z90.710: ICD-10-CM

## 2019-03-05 DIAGNOSIS — J45.909: ICD-10-CM

## 2019-03-05 DIAGNOSIS — Z88.8: ICD-10-CM

## 2019-03-05 DIAGNOSIS — N17.9: ICD-10-CM

## 2019-03-05 DIAGNOSIS — E10.10: Primary | ICD-10-CM

## 2019-03-05 DIAGNOSIS — Z90.49: ICD-10-CM

## 2019-03-05 DIAGNOSIS — E87.1: ICD-10-CM

## 2019-03-05 DIAGNOSIS — F32.9: ICD-10-CM

## 2019-03-05 DIAGNOSIS — Z88.0: ICD-10-CM

## 2019-03-05 DIAGNOSIS — F41.9: ICD-10-CM

## 2019-03-05 DIAGNOSIS — E03.9: ICD-10-CM

## 2019-03-05 DIAGNOSIS — Z87.891: ICD-10-CM

## 2019-03-05 DIAGNOSIS — Z90.721: ICD-10-CM

## 2019-03-05 LAB
ALBUMIN SERPL-MCNC: 3.5 G/DL (ref 3.4–5)
ALBUMIN/GLOB SERPL: 0.8 {RATIO} (ref 1–1.7)
ALP SERPL-CCNC: 100 U/L (ref 46–116)
ALT SERPL-CCNC: 18 U/L (ref 14–59)
ANION GAP SERPL CALC-SCNC: 14 MMOL/L (ref 6–14)
ANION GAP SERPL CALC-SCNC: 20 MMOL/L (ref 6–14)
APTT PPP: (no result) S
AST SERPL-CCNC: 16 U/L (ref 15–37)
BACTERIA #/AREA URNS HPF: 0 /HPF
BASOPHILS # BLD AUTO: 0 X10^3/UL (ref 0–0.2)
BASOPHILS NFR BLD: 0 % (ref 0–3)
BILIRUB SERPL-MCNC: 0.6 MG/DL (ref 0.2–1)
BILIRUB UR QL STRIP: (no result)
BUN/CREAT SERPL: 15 (ref 6–20)
CA-I SERPL ISE-MCNC: 15 MG/DL (ref 7–20)
CA-I SERPL ISE-MCNC: 17 MG/DL (ref 7–20)
CALCIUM SERPL-MCNC: 8.8 MG/DL (ref 8.5–10.1)
CALCIUM SERPL-MCNC: 9.5 MG/DL (ref 8.5–10.1)
CHLORIDE SERPL-SCNC: 91 MMOL/L (ref 98–107)
CHLORIDE SERPL-SCNC: 99 MMOL/L (ref 98–107)
CO2 SERPL-SCNC: 20 MMOL/L (ref 21–32)
CO2 SERPL-SCNC: 24 MMOL/L (ref 21–32)
CREAT SERPL-MCNC: 1.1 MG/DL (ref 0.6–1)
CREAT SERPL-MCNC: 1.1 MG/DL (ref 0.6–1)
EOSINOPHIL NFR BLD: 0.1 X10^3/UL (ref 0–0.7)
EOSINOPHIL NFR BLD: 1 % (ref 0–3)
ERYTHROCYTE [DISTWIDTH] IN BLOOD BY AUTOMATED COUNT: 13.2 % (ref 11.5–14.5)
FIBRINOGEN PPP-MCNC: CLEAR MG/DL
GFR SERPLBLD BASED ON 1.73 SQ M-ARVRAT: 59.6 ML/MIN
GFR SERPLBLD BASED ON 1.73 SQ M-ARVRAT: 59.6 ML/MIN
GLOBULIN SER-MCNC: 4.4 G/DL (ref 2.2–3.8)
GLUCOSE SERPL-MCNC: 227 MG/DL (ref 70–99)
GLUCOSE SERPL-MCNC: 684 MG/DL (ref 70–99)
GLUCOSE UR STRIP-MCNC: 500 MG/DL
HCT VFR BLD CALC: 45.2 % (ref 36–47)
HGB BLD-MCNC: 14.6 G/DL (ref 12–15.5)
INFLUENZA A PATIENT: NEGATIVE
INFLUENZA B PATIENT: NEGATIVE
LYMPHOCYTES # BLD: 1.6 X10^3/UL (ref 1–4.8)
LYMPHOCYTES NFR BLD AUTO: 16 % (ref 24–48)
MCH RBC QN AUTO: 30 PG (ref 25–35)
MCHC RBC AUTO-ENTMCNC: 32 G/DL (ref 31–37)
MCV RBC AUTO: 92 FL (ref 79–100)
MONO #: 0.5 X10^3/UL (ref 0–1.1)
MONOCYTES NFR BLD: 5 % (ref 0–9)
NEUT #: 7.7 X10^3UL (ref 1.8–7.7)
NEUTROPHILS NFR BLD AUTO: 77 % (ref 31–73)
NITRITE UR QL STRIP: (no result)
PLATELET # BLD AUTO: 223 X10^3/UL (ref 140–400)
POTASSIUM SERPL-SCNC: 4.5 MMOL/L (ref 3.5–5.1)
POTASSIUM SERPL-SCNC: 4.8 MMOL/L (ref 3.5–5.1)
PROT SERPL-MCNC: 7.9 G/DL (ref 6.4–8.2)
RBC # BLD AUTO: 4.9 X10^6/UL (ref 3.5–5.4)
RBC #/AREA URNS HPF: 0 /HPF (ref 0–2)
SODIUM SERPL-SCNC: 131 MMOL/L (ref 136–145)
SODIUM SERPL-SCNC: 137 MMOL/L (ref 136–145)
SP GR UR STRIP: <=1.005
SQUAMOUS #/AREA URNS LPF: (no result) /LPF
UROBILINOGEN UR-MCNC: 0.2 MG/DL
WBC # BLD AUTO: 10 X10^3/UL (ref 4–11)
WBC #/AREA URNS HPF: 0 /HPF (ref 0–4)

## 2019-03-05 PROCEDURE — 87070 CULTURE OTHR SPECIMN AEROBIC: CPT

## 2019-03-05 PROCEDURE — 71046 X-RAY EXAM CHEST 2 VIEWS: CPT

## 2019-03-05 PROCEDURE — 85025 COMPLETE CBC W/AUTO DIFF WBC: CPT

## 2019-03-05 PROCEDURE — 96376 TX/PRO/DX INJ SAME DRUG ADON: CPT

## 2019-03-05 PROCEDURE — 96365 THER/PROPH/DIAG IV INF INIT: CPT

## 2019-03-05 PROCEDURE — 36415 COLL VENOUS BLD VENIPUNCTURE: CPT

## 2019-03-05 PROCEDURE — 83036 HEMOGLOBIN GLYCOSYLATED A1C: CPT

## 2019-03-05 PROCEDURE — 82947 ASSAY GLUCOSE BLOOD QUANT: CPT

## 2019-03-05 PROCEDURE — 87804 INFLUENZA ASSAY W/OPTIC: CPT

## 2019-03-05 PROCEDURE — 87641 MR-STAPH DNA AMP PROBE: CPT

## 2019-03-05 PROCEDURE — 85027 COMPLETE CBC AUTOMATED: CPT

## 2019-03-05 PROCEDURE — 87205 SMEAR GRAM STAIN: CPT

## 2019-03-05 PROCEDURE — 80048 BASIC METABOLIC PNL TOTAL CA: CPT

## 2019-03-05 PROCEDURE — 84443 ASSAY THYROID STIM HORMONE: CPT

## 2019-03-05 PROCEDURE — 80053 COMPREHEN METABOLIC PANEL: CPT

## 2019-03-05 PROCEDURE — 81001 URINALYSIS AUTO W/SCOPE: CPT

## 2019-03-05 PROCEDURE — 96366 THER/PROPH/DIAG IV INF ADDON: CPT

## 2019-03-05 PROCEDURE — 96375 TX/PRO/DX INJ NEW DRUG ADDON: CPT

## 2019-03-05 RX ADMIN — ACETAMINOPHEN PRN MG: 500 TABLET ORAL at 14:40

## 2019-03-05 RX ADMIN — DEXTROSE, SODIUM CHLORIDE, AND POTASSIUM CHLORIDE SCH MLS/HR: 5; .45; .15 INJECTION INTRAVENOUS at 22:20

## 2019-03-05 RX ADMIN — DEXTROSE, SODIUM CHLORIDE, AND POTASSIUM CHLORIDE SCH MLS/HR: 5; .45; .15 INJECTION INTRAVENOUS at 14:41

## 2019-03-05 NOTE — PHYS DOC
Past History


Past Medical History:  Asthma, Bipolar, Diabetes


Past Surgical History:  Appendectomy, Cholecystectomy, Hysterectomy, 

Tonsillectomy


Smoking:  Cigarettes


Alcohol Use:  Occasionally


Drug Use:  None





Adult General


Chief Complaint


Chief Complaint:  HYPERGLYCEMIA





HPI


HPI


28-year-old female returns emergency room with hyperglycemia. I just saw this 

patient in the emergency room a couple days ago. She had blood sugar of over 

600 at that time. We were able to get it down in the emergency room and the 

patient was feeling ill enough to go home. At this time, the patient is also 

has a cough and vomiting. She has been trying to increase her insulin 2, save 

for blood sugar but has been unable to get good control. She has had low-grade 

fever of 100.





Review of Systems


Review of Systems





Constitutional: Denies fever or chills []


Eyes: Denies change in visual acuity, redness, or eye pain []


HENT: Denies nasal congestion or sore throat []


Respiratory: Cough without shortness of breath []


Cardiovascular: No additional information not addressed in HPI []


GI: Nausea, vomiting.[]


: Denies dysuria or hematuria []


Musculoskeletal: Denies back pain or joint pain []


Integument: Denies rash or skin lesions []


Neurologic: Denies headache, focal weakness or sensory changes []


Endocrine: Denies polyuria or polydipsia []





All other systems were reviewed and found to be within normal limits, except as 

documented in this note.





Current Medications


Current Medications





Current Medications








 Medications


  (Trade)  Dose


 Ordered  Sig/Nitish  Start Time


 Stop Time Status Last Admin


Dose Admin


 


 Insulin Human


 Regular


  (HumuLIN R VIAL)  10 unit  1X  ONCE  3/5/19 10:30


 3/5/19 10:31 UNV  





 


 Ondansetron HCl


  (Zofran)  4 mg  1X  ONCE  3/5/19 10:30


 3/5/19 10:31 UNV  





 


 Sodium Chloride  1,000 ml @ 


 1,000 mls/hr  1X  ONCE  3/5/19 10:30


 3/5/19 11:29   














Allergies


Allergies





Allergies








Coded Allergies Type Severity Reaction Last Updated Verified


 


  Penicillins Allergy Intermediate rash 10/10/18 Yes


 


  metformin Allergy Intermediate rash 10/10/18 Yes


 


  vancomycin Allergy Intermediate red man syndrome 10/10/18 Yes


 


  cefuroxime Allergy Unknown  10/10/18 Yes











Physical Exam


Physical Exam





Constitutional: Well developed, well nourished, no acute distress, non-toxic 

appearance. []


HENT: Normocephalic, atraumatic, bilateral external ears normal, oropharynx 

moist, no oral exudates, nose normal. []


Eyes: PERRLA, EOMI, conjunctiva normal, no discharge. [] 


Neck: Normal range of motion, no tenderness, supple, no stridor. [] 


Cardiovascular:Heart rate regular rhythm, no murmur []


Lungs & Thorax:  Bilateral breath sounds clear to auscultation []


Abdomen: Bowel sounds normal, soft, no tenderness, no masses, no pulsatile 

masses. [] 


Skin: Warm, dry, no erythema, no rash. [] 


Back: No tenderness, no CVA tenderness. [] 


Extremities: No tenderness, no cyanosis, no clubbing, ROM intact, no edema. [] 


Neurologic: Alert and oriented X 3, normal motor function, normal sensory 

function, no focal deficits noted. []


Psychologic: Affect normal, judgement normal, mood normal. []





EKG


EKG


[]





Radiology/Procedures


Radiology/Procedures


[]





Course & Med Decision Making


Course & Med Decision Making


Pertinent Labs and Imaging studies reviewed. (See chart for details)


The patient's labs are significant for a sugar of 684. Her anion gap is 20. Her 

influenza is negative. I have given her a bolus of 10 units of insulin IV. We 

will also start an insulin drip. I will admit the patient. I discussed the 

patient with Dr. Christian and he has accepted the patient for admission.


[]





Dragon Disclaimer


Dragon Disclaimer


This electronic medical record was generated, in whole or in part, using a 

voice recognition dictation system.





Departure


Departure:


Impression:  


 Primary Impression:  


 DKA (diabetic ketoacidoses)


Disposition:  09 ADMITTED AS INPATIENT


Condition:  STABLE


Referrals:  


KAYLA IRIZARRY MD (PCP)





Problem Qualifiers








 Primary Impression:  


 DKA (diabetic ketoacidoses)


 Diabetes mellitus type:  type 2  Diabetes mellitus complication detail:  

without coma  Qualified Codes:  E11.10 - Type 2 diabetes mellitus with 

ketoacidosis without coma








SHIRAZ POSADAS DO Mar 5, 2019 10:28

## 2019-03-05 NOTE — HP
ADMIT DATE:  2019



HISTORY OF PRESENT ILLNESS:  The patient is a 28-year-old  female

patient, who came to the Emergency Room complaining that she continued to have

recurrent bouts of nausea, vomiting.  She did also complain of low-grade fever,

cough and she has been trying to adjust insulin to get a better control of blood

sugar; however, her blood sugar is extremely high.  In fact by the time she

arrived to the Emergency Room her blood sugar was about 684 with an anion gap of

20 and was admitted with mild diabetic ketoacidosis.  She apparently was seen 2

days ago in the Emergency Room with similar presentation and was discharged

home.  She was given IV fluid and insulin drip, was admitted to ICU to be

monitored closely.



PAST MEDICAL HISTORY:  Significant for type 1 insulin-dependent diabetes

mellitus diagnosed when she was 15 years old.  She is known to have bronchial

asthma, hypothyroidism, has had carpal tunnel syndrome, De Quervain's

tenosynovitis as well as anxiety and depression.



PAST SURGICAL HISTORY:  Significant for tonsillectomy and adenoidectomy,

appendectomy, total abdominal hysterectomy and unilateral oophorectomy.  She has

2  section, esophagogastroduodenoscopy as well as colonoscopy.



ALLERGIES:  She is allergic to PENICILLIN, VANCOMYCIN, CEFUROXIME, and

METFORMIN.



MEDICATIONS:  She is currently on following medications:  She is on Flexeril 10

mg 3 times a day, ketorolac, tromethamine 10 mg every 6 hours.  She is on

Meloxicam 50 mg daily, hydrocodone/APAP 5/325 one tablet every 4-6 hours,

alprazolam 0.5 mg twice a day, and ondansetron ODT 4 mg every 8 hours.  She is

also on Humulin N and Humulin R.



FAMILY HISTORY:  She is the only child, has no brothers or sisters.  Her father

 at age of 40 committing suicide.  Her mother is still alive at age of 50

and has anxiety and depression.



SOCIAL HISTORY:  She is , has one daughter and 2 sons.  She quit smoking

7 months ago.  Drinks alcohol occasionally.  She does not use any drugs.  She is

currently on disability.



PHYSICAL EXAMINATION:

GENERAL:  On arrival to the Emergency Room, she looked well and was clearly in

no apparent respiratory distress.  No pallor, jaundice, cyanosis, or

thyromegaly.  No jugular venous distension.  No limb edema.

VITAL SIGNS:  Her heart rate was 76, blood pressure was 120/67, temperature was

98.2, respiratory rate was 18 and her oxygen saturation was 98% on room air.

HEAD, EYES, EARS, NOSE AND THROAT:  Showed normocephalic, atraumatic.

NECK:  Supple.

HEART:  Showed normal first and second heart sounds.  No gallop, rub, or murmur.

CHEST:  Clear to auscultation.  No crepitation or rhonchi.

ABDOMEN:  Distended, soft, nontender.

NEUROLOGIC:  She is awake, alert, responding appropriately.  All cranial nerves

intact.

EXTREMITIES:  She moves extremities without difficulty.



LABORATORY DATA:  Her lab work showed a white cell count of 8600, hemoglobin 14,

hematocrit 43, MCV 93, and platelet count 217,000.  Her chemistry showed serum

sodium of 132, potassium 4.4, chloride 91, bicarbonate 21, anion gap of 20, BUN

19, creatinine 1.2.  Her chemistry showed a serum sodium 131, potassium 4.8,

chloride 91, bicarbonate 20, anion gap of 20, BUN 17, creatinine 1.1, estimated

GFR was 59 mL per minute.  Her glucose was 684, calcium was 9.5.  Total

bilirubin, AST, ALT, alkaline phosphatase were normal.  Total protein was 7.9,

albumin was 3.5.  Her TSH was 1.436.



IMPRESSION AND PLAN:  In summary, this is a 28-year-old  female

patient, who came in with mild diabetic ketoacidosis.  Her blood sugar was 684. 

She has mild dilutional hyponatremia and anion gap metabolic acidosis and

probably dehydration and mild acute kidney injury as her creatinine is up to

1.1.



PLAN:  To continue the IV fluid, continue as per diabetic ketoacidosis protocol.

 Continue with insulin drip.  If the patient is able can tolerate food, can

start with clear liquid diet and advance as tolerated.





______________________________

SONYA KILPATRICK MD



DR:  ESTEE/neri  JOB#:  [id number] / [id number]

DD:  2019 14:41  DT:  2019 15:03

## 2019-03-06 VITALS — DIASTOLIC BLOOD PRESSURE: 77 MMHG | SYSTOLIC BLOOD PRESSURE: 117 MMHG

## 2019-03-06 VITALS — DIASTOLIC BLOOD PRESSURE: 46 MMHG | SYSTOLIC BLOOD PRESSURE: 104 MMHG

## 2019-03-06 VITALS — SYSTOLIC BLOOD PRESSURE: 117 MMHG | DIASTOLIC BLOOD PRESSURE: 82 MMHG

## 2019-03-06 VITALS — SYSTOLIC BLOOD PRESSURE: 108 MMHG | DIASTOLIC BLOOD PRESSURE: 56 MMHG

## 2019-03-06 VITALS — DIASTOLIC BLOOD PRESSURE: 63 MMHG | SYSTOLIC BLOOD PRESSURE: 108 MMHG

## 2019-03-06 VITALS — SYSTOLIC BLOOD PRESSURE: 98 MMHG | DIASTOLIC BLOOD PRESSURE: 70 MMHG

## 2019-03-06 VITALS — DIASTOLIC BLOOD PRESSURE: 66 MMHG | SYSTOLIC BLOOD PRESSURE: 106 MMHG

## 2019-03-06 VITALS — SYSTOLIC BLOOD PRESSURE: 102 MMHG | DIASTOLIC BLOOD PRESSURE: 57 MMHG

## 2019-03-06 VITALS — SYSTOLIC BLOOD PRESSURE: 97 MMHG | DIASTOLIC BLOOD PRESSURE: 62 MMHG

## 2019-03-06 VITALS — DIASTOLIC BLOOD PRESSURE: 75 MMHG | SYSTOLIC BLOOD PRESSURE: 111 MMHG

## 2019-03-06 LAB
ALBUMIN SERPL-MCNC: 2.8 G/DL (ref 3.4–5)
ALBUMIN/GLOB SERPL: 0.8 {RATIO} (ref 1–1.7)
ALP SERPL-CCNC: 71 U/L (ref 46–116)
ALT SERPL-CCNC: 17 U/L (ref 14–59)
ANION GAP SERPL CALC-SCNC: 7 MMOL/L (ref 6–14)
AST SERPL-CCNC: 24 U/L (ref 15–37)
BILIRUB SERPL-MCNC: 0.2 MG/DL (ref 0.2–1)
BUN/CREAT SERPL: 16 (ref 6–20)
CA-I SERPL ISE-MCNC: 11 MG/DL (ref 7–20)
CALCIUM SERPL-MCNC: 8.1 MG/DL (ref 8.5–10.1)
CHLORIDE SERPL-SCNC: 104 MMOL/L (ref 98–107)
CO2 SERPL-SCNC: 29 MMOL/L (ref 21–32)
CREAT SERPL-MCNC: 0.7 MG/DL (ref 0.6–1)
ERYTHROCYTE [DISTWIDTH] IN BLOOD BY AUTOMATED COUNT: 12.9 % (ref 11.5–14.5)
GFR SERPLBLD BASED ON 1.73 SQ M-ARVRAT: 100.4 ML/MIN
GLOBULIN SER-MCNC: 3.7 G/DL (ref 2.2–3.8)
GLUCOSE SERPL-MCNC: 157 MG/DL (ref 70–99)
HBA1C MFR BLD: 10.4 % (ref 4.8–5.6)
HCT VFR BLD CALC: 38.5 % (ref 36–47)
HGB BLD-MCNC: 12.8 G/DL (ref 12–15.5)
MCH RBC QN AUTO: 30 PG (ref 25–35)
MCHC RBC AUTO-ENTMCNC: 33 G/DL (ref 31–37)
MCV RBC AUTO: 90 FL (ref 79–100)
PLATELET # BLD AUTO: 206 X10^3/UL (ref 140–400)
POTASSIUM SERPL-SCNC: 3.9 MMOL/L (ref 3.5–5.1)
PROT SERPL-MCNC: 6.5 G/DL (ref 6.4–8.2)
RBC # BLD AUTO: 4.28 X10^6/UL (ref 3.5–5.4)
SODIUM SERPL-SCNC: 140 MMOL/L (ref 136–145)
WBC # BLD AUTO: 5.4 X10^3/UL (ref 4–11)

## 2019-03-06 RX ADMIN — DEXTROSE, SODIUM CHLORIDE, AND POTASSIUM CHLORIDE SCH MLS/HR: 5; .45; .15 INJECTION INTRAVENOUS at 05:40

## 2019-03-06 RX ADMIN — INSULIN LISPRO SCH UNITS: 100 INJECTION, SOLUTION INTRAVENOUS; SUBCUTANEOUS at 17:00

## 2019-03-06 RX ADMIN — DEXTROSE, SODIUM CHLORIDE, AND POTASSIUM CHLORIDE SCH MLS/HR: 5; .45; .15 INJECTION INTRAVENOUS at 08:04

## 2019-03-06 RX ADMIN — INSULIN LISPRO SCH UNITS: 100 INJECTION, SOLUTION INTRAVENOUS; SUBCUTANEOUS at 09:59

## 2019-03-06 RX ADMIN — ACETAMINOPHEN PRN MG: 500 TABLET ORAL at 08:15

## 2019-03-06 RX ADMIN — INSULIN LISPRO SCH UNITS: 100 INJECTION, SOLUTION INTRAVENOUS; SUBCUTANEOUS at 08:09

## 2019-03-06 NOTE — DS
DATE OF DISCHARGE:  03/06/2019



HISTORY OF PRESENT ILLNESS:  The patient is a 28-year-old  female

patient, who was admitted yesterday with complaint of recurrent bouts of nausea,

vomiting, low-grade fever, cough, has been trying to adjust insulin to get a

better control of her blood sugar, however, her blood sugar has been extremely

high.  By the time she arrived to the Emergency Room, her blood sugar was about

684 mg/dL with anion gap of 20 and she was admitted with mild diabetic

ketoacidosis, started on IV fluid and insulin drip.  She did actually very well.

 She has had no further episode of nausea or vomiting.  Her blood sugar is much

better controlled.  Her anion gap is down to 7 and her creatinine came down from

1.1-0.7.  A decision was made to discharge home to continue her home medication.

 We did check her chest x-ray, which was unremarkable.  She was afebrile and her

urinalysis was unremarkable.



PHYSICAL EXAMINATION:

GENERAL:  When I examined her this afternoon, she was sitting in the edge of bed

comfortably in no apparent distress.  No pallor, jaundice, cyanosis, or

thyromegaly.  No jugular venous distension.  No limb edema.

VITAL SIGNS:  Her heart rate was 75, blood pressure was 104/46, temperature was

98.1, respiratory rate was 18, and oxygen saturation was 99%.

The rest of clinical exam is stable.



LABORATORY DATA:  Showed her white cell count was 5400, hemoglobin 12.8,

hematocrit 38.5, MCV 90, and platelet count 206,000.  Her serum sodium was 140,

potassium 3.9, chloride 104, bicarbonate 29, anion gap of 7, BUN 11, creatinine

0.7, estimated GFR was 100 mL per minute.  Her calcium was 8.1.  Total

bilirubin, AST, ALT, alkaline phosphatase were normal.  Total protein 6.5,

albumin was 2.8.



DISCHARGE MEDICATIONS:  She was discharged home to continue on her albuterol

sulfate one puff every 6 hours, alprazolam for Xanax 1 mg 3 times a day, Vraylar

4.5 mg at bedtime, divalproex 750 mg at bedtime, Humulin, Novolin R insulin 3

times a day with meals, and levothyroxine sodium, Synthroid 125 mcg once a day. 

She is also on NPH for Novolin N 7 units at bedtime.



FINAL DISCHARGE DIAGNOSES:

1.  Diabetic ketoacidosis, resolved.

2.  Acute kidney injury, resolved.



Other medical problems include bronchial asthma, hypothyroidism, de Quervain's

tenosynovitis, anxiety and depression, and bilateral carpal tunnel syndrome.





______________________________

SONYA KILPATRICK MD



DR:  ESTEE/neri  JOB#:  [id number] / [id number]

DD:  03/06/2019 17:32  DT:  03/06/2019 19:25

## 2019-03-06 NOTE — RAD
EXAM: Chest, 2 views.

 

HISTORY: Cough.

 

COMPARISON: None.

 

FINDINGS: 2 views of the chest are obtained. There is no infiltrate, 

pleural effusion or pneumothorax. The heart is normal in size.

 

IMPRESSION: No acute pulmonary finding.

 

Electronically signed by: Yanni Castro MD (3/6/2019 9:28 AM) [place] [id number]